# Patient Record
Sex: MALE | Race: WHITE
[De-identification: names, ages, dates, MRNs, and addresses within clinical notes are randomized per-mention and may not be internally consistent; named-entity substitution may affect disease eponyms.]

---

## 2019-01-29 ENCOUNTER — HOSPITAL ENCOUNTER (EMERGENCY)
Dept: HOSPITAL 25 - ED | Age: 59
Discharge: HOME | End: 2019-01-29
Payer: COMMERCIAL

## 2019-01-29 VITALS — SYSTOLIC BLOOD PRESSURE: 146 MMHG | DIASTOLIC BLOOD PRESSURE: 85 MMHG
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
## 2019-01-29 DIAGNOSIS — S52.502A: Primary | ICD-10-CM
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
## 2019-01-29 PROCEDURE — 99282 EMERGENCY DEPT VISIT SF MDM: CPT

## 2019-01-29 NOTE — ED
Upper Extremity Pain





- HPI Summary


HPI Summary: 


Pt is a 57 y/o male who presents to the ED s/p hand injury. He states he was 

carrying pails of water to his barn when he slipped on ice. Pt held out his 

left hand and most of his body weight fell on it. He had immediate pain to his 

left wrist and heard a snap. He denies any head injury, or shoulder or elbow 

pain. Pt is able to move his fingers and move his hand side-to-side, but has 

pain with flexion and extension. He rates the pain as 5/10 in severity. Pt 

denies any numbness or paresthesia. He has not taken any OTC medications or 

applied ice to the area. Pt is left-handed. He is accompanied by his wife. Pt 

denies the use of blood thinners.








- History of Current Complaint


Chief Complaint: EDExtremityUpper


Stated Complaint: FALL/LT WRIST INJURY


Time Seen by Provider: 01/29/19 21:18


Hx Obtained From: Patient


Mechanism Of Injury: Fall From A Standing Position - slipped on ice


Onset/Duration: Started Hours Ago - PTA, Still Present


Timing: Constant


Severity Currently: Moderate - 5/10


Pain Location: Wrist - left


Aggravating Factor(s): Flexion, Extension


Alleviating Factor(s): Nothing


Associated Signs & Symptoms: Negative: Numbness/Tingling


Related History: Dominant Hand Left





- Allergies/Home Medications


Allergies/Adverse Reactions: 


 Allergies











Allergy/AdvReac Type Severity Reaction Status Date / Time


 


No Known Allergies Allergy   Verified 01/29/19 21:07











Home Medications: 


 Home Medications





NK [No Home Medications Reported]  01/29/19 [History Confirmed 01/29/19]











PMH/Surg Hx/FS Hx/Imm Hx


Endocrine/Hematology History: 


   Denies: Hx Diabetes


Cardiovascular History: 


   Denies: Hx Hypercholesterolemia, Hx Hypertension


Infectious Disease History: No


Infectious Disease History: 


   Denies: Traveled Outside the [place] in Last 30 Days





- Family History


Known Family History: 


   Negative: Cardiac Disease, Diabetes





- Social History


Alcohol Use: None


Hx Substance Use: No


Substance Use Type: Reports: None


Hx Tobacco Use: No


Smoking Status (MU): Never Smoked Tobacco





Review of Systems


Positive: Arthralgia - left wrist


Negative: Paresthesia, Numbness


All Other Systems Reviewed And Are Negative: Yes





Physical Exam





- Summary


Physical Exam Summary: 


Appearance: Well appearing, no pain distress


Skin: warm, dry, reflects adequate perfusion


Head/face: normal


Eyes: EOMI, ALISTAIR


ENT: mucous membranes moist


Neck: supple, non-tender


Respiratory: CTA, breath sounds present


Cardiovascular: RRR, pulses symmetrical 


Abdomen: non-tender, soft


Bowel Sounds: present


Musculoskeletal: strength/ROM intact, no snuffbox tenderness, mild tenderness 

of dorsal distal left radius and ulna


Neuro: normal, sensory motor intact, A&Ox3


Triage Information Reviewed: Yes


Vital Signs On Initial Exam: 


 Initial Vitals











Temp Pulse Resp BP Pulse Ox


 


 98.1 F   67   16   161/96   98 


 


 01/29/19 21:04  01/29/19 21:04  01/29/19 21:04  01/29/19 21:04  01/29/19 21:04











Vital Signs Reviewed: Yes





Procedures





- Splinting


  ** Left Upper Extremity


Splint: sugar-tong


Pre-Proc Neuro Vasc Exam: normal


Post-Proc Neuro Vasc Exam: normal





Diagnostics





- Vital Signs


 Vital Signs











  Temp Pulse Resp BP Pulse Ox


 


 01/29/19 21:04  98.1 F  67  16  161/96  98














- Laboratory


Lab Statement: Any lab studies that have been ordered have been reviewed, and 

results considered in the medical decision making process.





- Radiology


  ** Wrist XR


Radiology Interpretation Completed By: ED Physician


Summary of Radiographic Findings: Distal radius fracture minimally displaced 

with articular surface involvement. Pending official radiology report.





Course/Dx





- Course


Course Of Treatment: Nurse's notes reviewed.  Patient with minimally displaced 

distal radius fracture with articular involvement.  Placed in sugar tong splint 

to follow-up with orthopedic hand.  Neurovascular intact.  Unaffected dominant 

left hand.





- Diagnoses


Differential Diagnosis/HQI/PQRI: Positive: Contusion, Fracture (Closed), Strain

, Sprain


Provider Diagnoses: 


 Distal radius fracture








Discharge





- Sign-Out/Discharge


Documenting (check all that apply): Patient Departure - Discharge





- Discharge Plan


Condition: Stable


Disposition: HOME


Patient Education Materials:  Wrist Fracture in Adults (ED)


Forms:  *Work Release


Referrals: 


Enrique Jimenez MD [Medical Doctor] - 


Additional Instructions: 


Ice, elevate at rest.  Ibuprofen, Tylenol as needed for discomfort.  Call 

orthopedist first thing in the morning to schedule prompt follow-up.  Return if 

worse, new symptoms or other concerns.  Nonweightbearing left wrist.





- Billing Disposition and Condition


Condition: STABLE


Disposition: Home





- Attestation Statements


Document Initiated by Scribe: Yes


Documenting Scribe: Nickie Cope


Provider For Whom Scribe is Documenting (Include Credential): Behzad Dan MD


Scribe Attestation: 


I, Nickie Cope, scribed for Behzad Dan MD on 01/30/19 at 0117. 


Scribe Documentation Reviewed: Yes


Provider Attestation: 


The documentation as recorded by the [email or web address] accurately reflects the 

service I personally performed and the decisions made by me, Behzad Dan MD


Status of Scribe Document: Viewed